# Patient Record
(demographics unavailable — no encounter records)

---

## 2025-03-28 NOTE — PLAN
[TextEntry] : I recommended physiotherapy, Diclofenac, and a follow-up in 8 weeks' time. If his symptoms are recalcitrant, we will send him for a new, high-quality MRI. He is not a surgical candidate. He will follow up in 2 months.

## 2025-03-28 NOTE — PHYSICAL EXAM
[de-identified] : His range of motion is limited. He is neurologically intact. His strength is intact and symmetric. He has no weakness, he has no numbness, tingling pins, needles, or long-track signs. [de-identified] : His MRI is consistent with age-appropriate disc height loss C5-6, with mild narrowing of the neuroforamen. The MRI is of poor quality, but it is sufficient for diagnosis.

## 2025-03-28 NOTE — END OF VISIT
[FreeTextEntry3] : Documented by Deepika Washburn acting as a scribe for Gaston Carrasquillo on 03/28/2025   All medical record entries made by the Scribe were at my, Dr. Gaston Carrasquillo direction and personally dictated by me on 03/28/2025 . I have reviewed the chart and agree that the record accurately reflects my personal performance of the history, physical exam, assessment and plan. I have also personally directed, reviewed, and agreed with the chart.

## 2025-03-28 NOTE — ADDENDUM
[FreeTextEntry1] : I, Deepika Washburn (scribe) assisted in filling out this chart under the dictation of Gaston Carrasquillo on 03/28/2025

## 2025-03-28 NOTE — PHYSICAL EXAM
[de-identified] : His range of motion is limited. He is neurologically intact. His strength is intact and symmetric. He has no weakness, he has no numbness, tingling pins, needles, or long-track signs. [de-identified] : His MRI is consistent with age-appropriate disc height loss C5-6, with mild narrowing of the neuroforamen. The MRI is of poor quality, but it is sufficient for diagnosis.

## 2025-03-28 NOTE — HISTORY OF PRESENT ILLNESS
[de-identified] : Ubaldo is a 36-year-old nephew of the late Sam Thompson. In April of last year, he began to experience an atraumatic stiff neck. This progressed until December. By January of this year, he was in unbearable pain. At that point, he returned from the [facility]. He brought an MRI with him which we reviewed today. Now he has left-sided neck pain.

## 2025-03-28 NOTE — HISTORY OF PRESENT ILLNESS
[de-identified] : Ubaldo is a 36-year-old nephew of the late Sam Thompson. In April of last year, he began to experience an atraumatic stiff neck. This progressed until December. By January of this year, he was in unbearable pain. At that point, he returned from the [facility]. He brought an MRI with him which we reviewed today. Now he has left-sided neck pain.
